# Patient Record
(demographics unavailable — no encounter records)

---

## 2024-10-29 NOTE — HISTORY OF PRESENT ILLNESS
[Postpartum Follow Up] : postpartum follow up [Delivery Date: ___] : on [unfilled] [] : delivered by vaginal delivery [Female] : Delivery History: baby girl [Wt. ___] : weighing [unfilled] [Breastfeeding] : currently nursing [Discharge HCT: ___] : hematocrit level was [unfilled] [Discharge HGB: ___] : hemoglobin level was [unfilled] [Complications:___] : complications include: [unfilled] [Gestational Diabetes] : gestational diabetes [Back to Normal] : is back to normal in size [None] : no vaginal bleeding [Normal] : the vagina was normal [Examination Of The Breasts] : breasts are normal [Doing Well] : is doing well [No Sign of Infection] : is showing no signs of infection [Excellent Pain Control] : has excellent pain control [Resumed Menses] : has not resumed her menses [Resumed [place]] : has not resumed intercourse [Intended Contraception] : the patient does not intended to use contraception postpartum [Abdominal Pain] : no abdominal pain [Breast Pain] : no breast pain [BreastFeeding Problems] : no breastfeeding problems [Heavy Bleeding] : no heavy bleeding [Irregular Bleeding] : no irregular bleeding [Chills] : no chills [Fatigue] : no fatigue [Dysuria] : no dysuria [Fever] : no fever [Headache] : no headache [Hematoma] : no vaginal hematoma [de-identified] : Reports "stretching" and mild burning sensation after urination, denies increased urinary frequency/urgency [de-identified] : 28yo P1 s/p  , with complaints of dysuria, otherwise doing well.   [de-identified] : - GDMA: script sent for PP 2HR GTT - f/u UA and Ucx - RTC for annual or PRN